# Patient Record
Sex: MALE | Race: WHITE | HISPANIC OR LATINO | Employment: FULL TIME | URBAN - METROPOLITAN AREA
[De-identification: names, ages, dates, MRNs, and addresses within clinical notes are randomized per-mention and may not be internally consistent; named-entity substitution may affect disease eponyms.]

---

## 2017-03-27 ENCOUNTER — OFFICE VISIT (OUTPATIENT)
Dept: URGENT CARE | Facility: CLINIC | Age: 42
End: 2017-03-27
Payer: COMMERCIAL

## 2017-03-27 ENCOUNTER — HOSPITAL ENCOUNTER (OUTPATIENT)
Facility: MEDICAL CENTER | Age: 42
End: 2017-03-27
Attending: PHYSICIAN ASSISTANT
Payer: OTHER MISCELLANEOUS

## 2017-03-27 VITALS
HEART RATE: 84 BPM | OXYGEN SATURATION: 94 % | WEIGHT: 193 LBS | SYSTOLIC BLOOD PRESSURE: 132 MMHG | RESPIRATION RATE: 16 BRPM | TEMPERATURE: 98.9 F | BODY MASS INDEX: 29.25 KG/M2 | HEIGHT: 68 IN | DIASTOLIC BLOOD PRESSURE: 78 MMHG

## 2017-03-27 DIAGNOSIS — R30.0 DYSURIA: ICD-10-CM

## 2017-03-27 DIAGNOSIS — Z20.2 POSSIBLE EXPOSURE TO STD: ICD-10-CM

## 2017-03-27 DIAGNOSIS — R30.0 DYSURIA: Primary | ICD-10-CM

## 2017-03-27 LAB
APPEARANCE UR: CLEAR
BILIRUB UR STRIP-MCNC: NEGATIVE MG/DL
COLOR UR AUTO: YELLOW
GLUCOSE UR STRIP.AUTO-MCNC: NEGATIVE MG/DL
KETONES UR STRIP.AUTO-MCNC: NEGATIVE MG/DL
LEUKOCYTE ESTERASE UR QL STRIP.AUTO: NEGATIVE
NITRITE UR QL STRIP.AUTO: NEGATIVE
PH UR STRIP.AUTO: 7 [PH] (ref 5–8)
PROT UR QL STRIP: NORMAL MG/DL
RBC UR QL AUTO: NORMAL
SP GR UR STRIP.AUTO: 1.01
UROBILINOGEN UR STRIP-MCNC: NEGATIVE MG/DL

## 2017-03-27 PROCEDURE — 87591 N.GONORRHOEAE DNA AMP PROB: CPT

## 2017-03-27 PROCEDURE — 99203 OFFICE O/P NEW LOW 30 MIN: CPT | Performed by: PHYSICIAN ASSISTANT

## 2017-03-27 PROCEDURE — 81002 URINALYSIS NONAUTO W/O SCOPE: CPT | Performed by: PHYSICIAN ASSISTANT

## 2017-03-27 PROCEDURE — 87491 CHLMYD TRACH DNA AMP PROBE: CPT

## 2017-03-27 RX ORDER — CEFTRIAXONE SODIUM 250 MG/1
250 INJECTION, POWDER, FOR SOLUTION INTRAMUSCULAR; INTRAVENOUS ONCE
Status: COMPLETED | OUTPATIENT
Start: 2017-03-27 | End: 2017-03-27

## 2017-03-27 RX ORDER — AZITHROMYCIN 1 G/1
1 POWDER, FOR SUSPENSION ORAL ONCE
Qty: 1 EACH | Refills: 0 | Status: SHIPPED | OUTPATIENT
Start: 2017-03-27 | End: 2017-03-27

## 2017-03-27 RX ADMIN — CEFTRIAXONE SODIUM 250 MG: 250 INJECTION, POWDER, FOR SOLUTION INTRAMUSCULAR; INTRAVENOUS at 20:03

## 2017-03-27 NOTE — MR AVS SNAPSHOT
"Warren Self   3/27/2017 6:15 PM   Office Visit   MRN: [id number]    Department:  [facility]   Dept Phone:  [phone]    Description:  Male : 1975   Provider:  Faina Santiago PA-C           Reason for Visit     Exposure to STD x 4-5 days      Allergies as of 3/27/2017     No Known Allergies      You were diagnosed with     Dysuria   [788.1.ICD-9-CM]         Vital Signs     Blood Pressure Pulse Temperature Respirations Height Weight    132/78 mmHg 84 37.2 °C (98.9 °F) 16 1.727 m (5' 8\") 87.544 kg (193 lb)    Body Mass Index Oxygen Saturation                29.35 kg/m2 94%          Basic Information     Date Of Birth Sex Race Ethnicity Preferred Language    1975 Male White  Origin (Chinese,Sudanese,British,Nick, etc) English      Health Maintenance     Patient has no pending health maintenance at this time      Current Immunizations     No immunizations on file.      Below and/or attached are the medications your provider expects you to take. Review all of your home medications and newly ordered medications with your provider and/or pharmacist. Follow medication instructions as directed by your provider and/or pharmacist. Please keep your medication list with you and share with your provider. Update the information when medications are discontinued, doses are changed, or new medications (including over-the-counter products) are added; and carry medication information at all times in the event of emergency situations     Allergies:  No Known Allergies          Medications  Valid as of: 2017 -  7:13 PM    Generic Name Brand Name Tablet Size Instructions for use    .                 Medicines prescribed today were sent to:     [id number] - [place] - [address] [facility] [address] [address]    Phone: [phone] Fax: [phone]    Open 24 Hours?: No      Medication refill instructions:       If your " prescription bottle indicates you have medication refills left, it is not necessary to call your provider’s office. Please contact your pharmacy and they will refill your medication.    If your prescription bottle indicates you do not have any refills left, you may request refills at any time through one of the following ways: The online E-Sign system (except Urgent Care), by calling your provider’s office, or by asking your pharmacy to contact your provider’s office with a refill request. Medication refills are processed only during regular business hours and may not be available until the next business day. Your provider may request additional information or to have a follow-up visit with you prior to refilling your medication.   *Please Note: Medication refills are assigned a new Rx number when refilled electronically. Your pharmacy may indicate that no refills were authorized even though a new prescription for the same medication is available at the pharmacy. Please request the medicine by name with the pharmacy before contacting your provider for a refill.        Your To Do List     Future Labs/Procedures Complete By Expires    CHLAMYDIA/GC PCR URINE OR SWAB  As directed 3/27/2018         E-Sign Access Code: [email or web address]  Expires: 4/26/2017  7:13 PM    E-Sign  A secure, online tool to manage your health information     [employer]’s E-Sign® is a secure, online tool that connects you to your personalized health information from the privacy of your home -- day or night - making it very easy for you to manage your healthcare. Once the activation process is completed, you can even access your medical information using the E-Sign beto, which is available for free in the Apple Beto store or Google Play store.     E-Sign provides the following levels of access (as shown below):   My Chart Features   [facility] Doctor [facility]  Specialists [facility]  Urgent  Care [facility]  Primary Care  Doctor   Email  your healthcare team securely and privately 24/7 X X X    Manage appointments: schedule your next appointment; view details of past/upcoming appointments X      Request prescription refills. X      View recent personal medical records, including lab and immunizations X X X X   View health record, including health history, allergies, medications X X X X   Read reports about your outpatient visits, procedures, consult and ER notes X X X X   See your discharge summary, which is a recap of your hospital and/or ER visit that includes your diagnosis, lab results, and care plan. X X       How to register for [employer]:  1. Go to  [email or web address].  2. Click on the Sign Up Now box, which takes you to the New Member Sign Up page. You will need to provide the following information:  a. Enter your [employer] Access Code exactly as it appears at the top of this page. (You will not need to use this code after you’ve completed the sign-up process. If you do not sign up before the expiration date, you must request a new code.)   b. Enter your date of birth.   c. Enter your home email address.   d. Click Submit, and follow the next screen’s instructions.  3. Create a 3seventy ID. This will be your 3seventy login ID and cannot be changed, so think of one that is secure and easy to remember.  4. Create a 3se[employer]y password. You can change your password at any time.  5. Enter your Password Reset Question and Answer. This can be used at a later time if you forget your password.   6. Enter your e-mail address. This allows you to receive e-mail notifications when new information is available in 3seventy.  7. Click Sign Up. You can now view your health information.    For assistance activating your [employer]y account, call [phone]

## 2017-03-28 LAB
C TRACH DNA GENITAL QL NAA+PROBE: NEGATIVE
N GONORRHOEA DNA GENITAL QL NAA+PROBE: NEGATIVE
SPECIMEN SOURCE: NORMAL

## 2017-03-28 ASSESSMENT — ENCOUNTER SYMPTOMS
FEVER: 0
SORE THROAT: 0
SWOLLEN GLANDS: 0

## 2017-03-28 NOTE — PROGRESS NOTES
"Subjective:      Warren Self is a 41 y.o. male who presents with Exposure to STD    Pt PMH, SocHx, SurgHx, FamHx, Drug allergies and medications reviewed with pt/EPIC.      Family history reviewed, it is not pertinent to this complaint.           HPI Comments: Patient presents with:  Exposure to STD: x 4-5 days    PT states his girlfriend is having some vaginal discharge and has some bumps on her labia, but they look like shave bumps.  PT denies rash or lesions , fever or sore throat.    Exposure to STD  This is a new problem. The current episode started in the past 7 days. The problem has been unchanged. Associated symptoms include urinary symptoms. Pertinent negatives include no fever, rash, sore throat or swollen glands. Exacerbated by: urination. He has tried nothing for the symptoms. The treatment provided no relief.       Review of Systems   Constitutional: Negative for fever.   HENT: Negative for sore throat.    Genitourinary: Positive for dysuria.   Skin: Negative for rash.   All other systems reviewed and are negative.         Objective:     /78 mmHg  Pulse 84  Temp(Src) 37.2 °C (98.9 °F)  Resp 16  Ht 1.727 m (5' 8\")  Wt 87.544 kg (193 lb)  BMI 29.35 kg/m2  SpO2 94%     Physical Exam   Constitutional: He is oriented to person, place, and time. He appears well-developed and well-nourished. No distress.   HENT:   Head: Normocephalic and atraumatic.   Eyes: Pupils are equal, round, and reactive to light.   Neck: Normal range of motion.   Cardiovascular: Normal rate, regular rhythm and normal heart sounds.    Pulmonary/Chest: Effort normal and breath sounds normal.   Genitourinary:   Declined     Musculoskeletal: Normal range of motion.   Neurological: He is alert and oriented to person, place, and time.   Skin: Skin is warm and dry.   Psychiatric: He has a normal mood and affect.   Nursing note and vitals reviewed.            UA: neg dip  Assessment/Plan:     1. Dysuria  POCT Urinalysis    " CHLAMYDIA/GC PCR URINE OR SWAB    cefTRIAXone (ROCEPHIN) injection 250 mg    azithromycin (ZITHROMAX) 1 GM powder   2. Possible exposure to STD  cefTRIAXone (ROCEPHIN) injection 250 mg    azithromycin (ZITHROMAX) 1 GM powder     Pt requested to be treated empirically today.     PT advised to abstain from all sexual activity for one week.      PT should follow up with PCP in 1-2 days for re-evaluation if symptoms have not improved.  Discussed red flags and reasons to return to UC or ED.  Pt and/or family verbalized understanding of diagnosis and follow up instructions and was given informational handout on diagnosis.  PT discharged.

## 2017-03-30 ENCOUNTER — TELEPHONE (OUTPATIENT)
Dept: URGENT CARE | Facility: PHYSICIAN GROUP | Age: 42
End: 2017-03-30

## 2017-03-31 ENCOUNTER — TELEPHONE (OUTPATIENT)
Dept: URGENT CARE | Facility: CLINIC | Age: 42
End: 2017-03-31

## 2017-04-01 NOTE — TELEPHONE ENCOUNTER
Pt called, notified pt test results were negative and advised pt if he had any symptoms for herpers he could go to the health department to get tested.